# Patient Record
Sex: MALE | Race: BLACK OR AFRICAN AMERICAN | NOT HISPANIC OR LATINO | Employment: FULL TIME | URBAN - METROPOLITAN AREA
[De-identification: names, ages, dates, MRNs, and addresses within clinical notes are randomized per-mention and may not be internally consistent; named-entity substitution may affect disease eponyms.]

---

## 2020-02-12 ENCOUNTER — HOSPITAL ENCOUNTER (EMERGENCY)
Facility: HOSPITAL | Age: 30
Discharge: HOME | End: 2020-02-12
Attending: EMERGENCY MEDICINE

## 2020-02-12 VITALS
TEMPERATURE: 101 F | WEIGHT: 175 LBS | HEIGHT: 72 IN | SYSTOLIC BLOOD PRESSURE: 138 MMHG | RESPIRATION RATE: 16 BRPM | OXYGEN SATURATION: 96 % | BODY MASS INDEX: 23.7 KG/M2 | HEART RATE: 92 BPM | DIASTOLIC BLOOD PRESSURE: 88 MMHG

## 2020-02-12 DIAGNOSIS — J10.1 INFLUENZA A: Primary | ICD-10-CM

## 2020-02-12 DIAGNOSIS — R50.9 FEVER, UNSPECIFIED FEVER CAUSE: ICD-10-CM

## 2020-02-12 DIAGNOSIS — S39.012A LUMBAR STRAIN, INITIAL ENCOUNTER: ICD-10-CM

## 2020-02-12 DIAGNOSIS — R52 GENERALIZED BODY ACHES: ICD-10-CM

## 2020-02-12 LAB
CTP QC/QA: YES
POC MOLECULAR INFLUENZA A AGN: POSITIVE
POC MOLECULAR INFLUENZA B AGN: NEGATIVE

## 2020-02-12 PROCEDURE — 99284 EMERGENCY DEPT VISIT MOD MDM: CPT | Mod: 25

## 2020-02-12 PROCEDURE — 63600175 PHARM REV CODE 636 W HCPCS: Performed by: NURSE PRACTITIONER

## 2020-02-12 PROCEDURE — 96372 THER/PROPH/DIAG INJ SC/IM: CPT

## 2020-02-12 PROCEDURE — 25000003 PHARM REV CODE 250: Performed by: NURSE PRACTITIONER

## 2020-02-12 PROCEDURE — 87502 INFLUENZA DNA AMP PROBE: CPT

## 2020-02-12 RX ORDER — ACETAMINOPHEN 500 MG
1000 TABLET ORAL
Status: COMPLETED | OUTPATIENT
Start: 2020-02-12 | End: 2020-02-12

## 2020-02-12 RX ORDER — OSELTAMIVIR PHOSPHATE 75 MG/1
75 CAPSULE ORAL 2 TIMES DAILY
Qty: 10 CAPSULE | Refills: 0 | Status: SHIPPED | OUTPATIENT
Start: 2020-02-12 | End: 2020-02-17

## 2020-02-12 RX ORDER — IBUPROFEN 800 MG/1
800 TABLET ORAL EVERY 6 HOURS PRN
Qty: 20 TABLET | Refills: 0 | Status: SHIPPED | OUTPATIENT
Start: 2020-02-12

## 2020-02-12 RX ORDER — KETOROLAC TROMETHAMINE 30 MG/ML
30 INJECTION, SOLUTION INTRAMUSCULAR; INTRAVENOUS
Status: COMPLETED | OUTPATIENT
Start: 2020-02-12 | End: 2020-02-12

## 2020-02-12 RX ADMIN — ACETAMINOPHEN 1000 MG: 500 TABLET ORAL at 04:02

## 2020-02-12 RX ADMIN — KETOROLAC TROMETHAMINE 30 MG: 30 INJECTION, SOLUTION INTRAMUSCULAR at 04:02

## 2020-02-12 NOTE — DISCHARGE INSTRUCTIONS
Take Tamiflu twice daily for 5 days as prescribed.  Take ibuprofen and Tylenol (acetaminophen) for fevers as needed.  Drink plenty of water and other hydrating fluids.    Follow-up with your regular doctor as needed.    Return to the emergency department immediately for any new or worsening symptoms.    Thank you for coming to our Emergency Department today. It is important to remember that some problems are difficult to diagnose and may not be found during your first visit. Be sure to follow up with your primary care doctor.  If you do not have one, you may contact the one listed on your discharge paperwork or you may also call the [facility] Appointment Desk at [phone] to schedule an appointment with one.     Return to the ER with any questions/concerns, new/concerning symptoms, worsening or failure to improve. Do not drive or make any important decisions for 24 hours if you have received any pain medications, sedatives or mood altering drugs during your ER visit.

## 2020-02-12 NOTE — ED TRIAGE NOTES
The patient reports lower bilateral back pain that radiates bilateral legs since yesterday. Patient reports taking Goody's for pain with no relief. Patient denies trauma. 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  Denies numbness/tingling.

## 2020-02-12 NOTE — ED PROVIDER NOTES
"Encounter Date: 2/12/2020    SCRIBE #1 NOTE: I, Mayco Allie , am scribing for, and in the presence of,  Cristino Ford NP. I have scribed the following portions of the note - Other sections scribed: ABRIL CM.       History     Chief Complaint   Patient presents with    Back Pain     " I have pain in my back (lower) and its shooting down the back of my legs".      Michael Deshone Ventry is a 29 y.o. male who presents to the ED for evaluation of lumbar back pain that started yesterday evening. The pain radiates down both legs and terminates in the knees bilaterally. He also reports associated generalized body aches, chills, and a cough that began yesterday night as well. The pain is exacerbated with bending forwards, bending backwards, lying down. 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 He denies any heavy lifting. Denies history of intravenous drug use, cancer, long-term steroid use.  He reports that his child has been sick at home and has had fevers up to 103°.  His child was never diagnosed with anything.  Take an NSAID at home with temperature mild relief of symptoms.  No other treatments attempted.    The history is provided by the patient.     Review of patient's allergies indicates:  No Known Allergies  History reviewed. No pertinent past medical history.  Past Surgical History:   Procedure Laterality Date    HIP SURGERY      left    LEG SURGERY      left     History reviewed. No pertinent family history.  Social History     Tobacco Use    Smoking status: Current Every Day Smoker    Smokeless tobacco: Never Used   Substance Use Topics    Alcohol use: Not Currently    Drug use: Not Currently     Review of Systems   Constitutional: Negative for fever.   HENT: Negative for sore throat.    Respiratory: Negative for shortness of breath.    Cardiovascular: Negative for chest pain.   Gastrointestinal: Negative for nausea.   Genitourinary: Negative for dysuria. "   Musculoskeletal: Positive for back pain.        Positive for bilateral leg pain.    Skin: Negative for rash.   Neurological: Negative for headaches.   Psychiatric/Behavioral: Negative for confusion.   All other systems reviewed and are negative.      Physical Exam     Initial Vitals [02/12/20 1606]   BP Pulse Resp Temp SpO2   138/88 92 16 98.8 °F (37.1 °C) 96 %      MAP       --         Physical Exam    Nursing note and vitals reviewed.  Constitutional: He appears well-developed and well-nourished. He is not diaphoretic. No distress.   HENT:   Head: Normocephalic and atraumatic.   Mouth/Throat: Oropharynx is clear and moist. No oropharyngeal exudate.   Eyes: Conjunctivae and EOM are normal. Pupils are equal, round, and reactive to light.   Neck: Normal range of motion. Neck supple.   Cardiovascular: Normal rate, regular rhythm, normal heart sounds and intact distal pulses.   Pulmonary/Chest: Breath sounds normal. No respiratory distress. He has no wheezes. He has no rales.   Abdominal: Soft. Bowel sounds are normal. There is no tenderness. There is no rebound and no guarding.   Musculoskeletal: Normal range of motion. He exhibits no edema.        Lumbar back: He exhibits tenderness and pain. He exhibits no swelling, no edema and no deformity.   Generalized lumbar tenderness including midline tenderness. Back pain worsens with lumbar flexion and extension.  Patient is ambulating with a steady gait.  Strength is equal and normal in bilateral lower extremities.  Peripheral pulses are normal.   Neurological: He is alert and oriented to person, place, and time. GCS score is 15. GCS eye subscore is 4. GCS verbal subscore is 5. GCS motor subscore is 6.   Skin: Skin is warm and dry.   Psychiatric: He has a normal mood and affect. Thought content normal.         ED Course   Procedures  Labs Reviewed   POCT INFLUENZA A/B MOLECULAR - Abnormal; Notable for the following components:       Result Value    POC Molecular  Influenza A Ag Positive (*)     All other components within normal limits          Imaging Results          X-Ray Lumbar Spine Ap And Lateral (Final result)  Result time 02/12/20 16:56:29    Final result by Aung Cespedes MD (02/12/20 16:56:29)                 Impression:      No acute radiographic abnormality.      Electronically signed by: Aung Cepsedes  Date:    02/12/2020  Time:    16:56             Narrative:    EXAMINATION:  XR LUMBAR SPINE AP AND LATERAL    CLINICAL HISTORY:  Low back pain, <6wks, no red flags, no prior management;    TECHNIQUE:  AP, lateral and spot images were performed of the lumbar spine.    COMPARISON:  None    FINDINGS:  Five non rib-bearing lumbar segments.    Alignment is satisfactory.  No acute fracture or traumatic subluxation.  Disc spaces are adequately maintained.  Posterior elements appear intact.    Minimal osteophytic spurring of the endplates.                                 Medical Decision Making:   History:   Old Medical Records: I decided to obtain old medical records.  Differential Diagnosis:   Lumbar strain, fracture, dislocation, contusion, epidural abscess, paraspinal neoplasm, influenza, viral syndrome, UTI, others  Clinical Tests:   Radiological Study: Ordered and Reviewed  ED Management:  DDx:  Influenza, viral syndrome, RSV, strep pharyngitis, viral pharyngitis, otitis media, sinusitis, pneumonia, bronchitis, meningitis, sepsis, others    HPI and physical exam as above.      The patient appears to have a viral infection.  Swab positive for influenza A.  Based upon the history and physical exam the patient does not appear to have a serious bacterial infection such as pneumonia, sepsis, otitis media, bacterial sinusitis, strep pharyngitis, parapharyngeal or peritonsillar abscess, meningitis. Respiratory effort is normal with no signs of increased work of breathing or respiratory distress. Lungs are clear to auscultation bilaterally in all fields. Mucous membranes  are moist and the patient is tolerating P.O. without difficulty.  Patient's temperature and heart rate are decreased following treatment with antipyretics.  Patient is nontoxic, alert, active, and appears very well at this time just prior to discharge. I have given specific return precautions to the patient and his significant other.  I will prescribe Tamiflu and ibuprofen.     The results and physical exam findings were reviewed with the patient and his significant other. Advised them to follow up with the patient's pediatrician for re-evaluation and further management.  ED return precautions given. All questions regarding diagnosis and plan were answered to the patient's fullest possible satisfaction. Patient expressed understanding of diagnosis, discharge instructions, and return precautions.                Scribe Attestation:   Scribe #1: I performed the above scribed service and the documentation accurately describes the services I performed. I attest to the accuracy of the note.                          Clinical Impression:       ICD-10-CM ICD-9-CM   1. Influenza A J10.1 487.1   2. Generalized body aches R52 780.96   3. Fever, unspecified fever cause R50.9 780.60   4. Lumbar strain, initial encounter S39.012A 847.2         Disposition:   Disposition: Discharged  Condition: Stable       I, Cristino Ford NP, personally performed the services described in this documentation. All medical record entries made by the scribe were at my direction and in my presence.  I have reviewed the chart and agree that the record reflects my personal performance and is accurate and complete.       Cristino Ford NP  02/12/20 [id number]